# Patient Record
Sex: FEMALE | Race: WHITE | NOT HISPANIC OR LATINO | URBAN - METROPOLITAN AREA
[De-identification: names, ages, dates, MRNs, and addresses within clinical notes are randomized per-mention and may not be internally consistent; named-entity substitution may affect disease eponyms.]

---

## 2024-06-11 DIAGNOSIS — D72.829 LEUKOCYTOSIS: Primary | ICD-10-CM

## 2024-06-11 DIAGNOSIS — D75.839 THROMBOCYTOSIS: ICD-10-CM

## 2024-06-11 DIAGNOSIS — R71.8 ELEVATED HEMATOCRIT: ICD-10-CM

## 2024-06-13 ENCOUNTER — OFFICE VISIT (OUTPATIENT)
Dept: HEMATOLOGY/ONCOLOGY | Facility: CLINIC | Age: 59
End: 2024-06-13
Payer: MEDICAID

## 2024-06-13 VITALS
HEART RATE: 92 BPM | OXYGEN SATURATION: 95 % | DIASTOLIC BLOOD PRESSURE: 73 MMHG | HEIGHT: 66 IN | BODY MASS INDEX: 33.51 KG/M2 | RESPIRATION RATE: 14 BRPM | WEIGHT: 208.5 LBS | TEMPERATURE: 98 F | SYSTOLIC BLOOD PRESSURE: 151 MMHG

## 2024-06-13 DIAGNOSIS — D72.829 LEUKOCYTOSIS: ICD-10-CM

## 2024-06-13 DIAGNOSIS — D75.839 THROMBOCYTOSIS: ICD-10-CM

## 2024-06-13 DIAGNOSIS — R71.8 ELEVATED HEMATOCRIT: ICD-10-CM

## 2024-06-13 PROCEDURE — 3008F BODY MASS INDEX DOCD: CPT | Mod: CPTII,,, | Performed by: STUDENT IN AN ORGANIZED HEALTH CARE EDUCATION/TRAINING PROGRAM

## 2024-06-13 PROCEDURE — 3078F DIAST BP <80 MM HG: CPT | Mod: CPTII,,, | Performed by: STUDENT IN AN ORGANIZED HEALTH CARE EDUCATION/TRAINING PROGRAM

## 2024-06-13 PROCEDURE — 1159F MED LIST DOCD IN RCRD: CPT | Mod: CPTII,,, | Performed by: STUDENT IN AN ORGANIZED HEALTH CARE EDUCATION/TRAINING PROGRAM

## 2024-06-13 PROCEDURE — 99204 OFFICE O/P NEW MOD 45 MIN: CPT | Mod: ,,, | Performed by: STUDENT IN AN ORGANIZED HEALTH CARE EDUCATION/TRAINING PROGRAM

## 2024-06-13 PROCEDURE — 3077F SYST BP >= 140 MM HG: CPT | Mod: CPTII,,, | Performed by: STUDENT IN AN ORGANIZED HEALTH CARE EDUCATION/TRAINING PROGRAM

## 2024-06-13 RX ORDER — HYDROCODONE BITARTRATE AND ACETAMINOPHEN 10; 325 MG/1; MG/1
1 TABLET ORAL 3 TIMES DAILY
COMMUNITY
Start: 2024-05-28

## 2024-06-13 RX ORDER — OMEPRAZOLE 20 MG/1
20 CAPSULE, DELAYED RELEASE ORAL DAILY
COMMUNITY

## 2024-06-13 RX ORDER — METOPROLOL SUCCINATE 50 MG/1
50 TABLET, EXTENDED RELEASE ORAL
COMMUNITY
Start: 2024-02-26

## 2024-06-13 RX ORDER — GABAPENTIN 300 MG/1
300-600 CAPSULE ORAL NIGHTLY
COMMUNITY
Start: 2024-04-16

## 2024-06-13 RX ORDER — RIVAROXABAN 2.5 MG/1
1 TABLET, FILM COATED ORAL 2 TIMES DAILY
COMMUNITY
Start: 2024-06-01

## 2024-06-13 RX ORDER — EZETIMIBE 10 MG/1
10 TABLET ORAL
COMMUNITY
Start: 2024-04-16

## 2024-06-13 RX ORDER — AMLODIPINE BESYLATE 5 MG/1
5 TABLET ORAL
COMMUNITY
Start: 2024-04-16

## 2024-06-13 RX ORDER — CILOSTAZOL 100 MG/1
100 TABLET ORAL 2 TIMES DAILY
COMMUNITY
Start: 2024-06-12

## 2024-06-13 RX ORDER — TIZANIDINE 4 MG/1
4-8 TABLET ORAL NIGHTLY
COMMUNITY
Start: 2024-06-12

## 2024-06-13 RX ORDER — PRAVASTATIN SODIUM 80 MG/1
80 TABLET ORAL
COMMUNITY
Start: 2024-06-12

## 2024-06-13 RX ORDER — ASPIRIN 81 MG/1
81 TABLET ORAL
COMMUNITY
Start: 2024-02-16

## 2024-06-13 RX ORDER — ALENDRONATE SODIUM 10 MG/1
10 TABLET ORAL
COMMUNITY
Start: 2024-05-14

## 2024-06-13 RX ORDER — CYCLOBENZAPRINE HCL 10 MG
10 TABLET ORAL 3 TIMES DAILY
COMMUNITY
Start: 2024-05-23

## 2024-06-13 NOTE — PROGRESS NOTES
Referring provider:  Yesy Pinon NP    Reason for consultation:  Leukocytosis, thrombocytosis    HPI:    Patient is a 58-year-old with history of PAD, CAD, hypertension, hyperlipidemia and chronic pain was seen by her primary care provider and had blood work done with findings of thrombocytosis, leukocytosis.  Patient was referred to our clinic for further treatment recommendations.  She presented to clinic on 06/13/2024 to establish care.    Interval history:    Today, 06/13/2024, denies any acute concerns today.  She denies any fevers, chills, night sweats.  She denies any over-the-counter herbs supplements currently.  She denies any recent steroid use    Laboratory       04/02/2024:  WBC count 12.6 hemoglobin 14.6, hematocrit 45.1, platelet count 370.  01/03/2024 WBC count 11.3, hemoglobin 14.7, MCV 96, platelet count 383, ANC 6.9.  05/9/23:  WBC count 11.12, hemoglobin 14.1, platelet count 368.        History reviewed. No pertinent past medical history.    History reviewed. No pertinent surgical history.    No family history on file.    Social History     Socioeconomic History    Marital status: Unknown   Tobacco Use    Smoking status: Every Day     Current packs/day: 1.00     Types: Cigarettes    Smokeless tobacco: Never   Substance and Sexual Activity    Alcohol use: Yes    Drug use: Never    Sexual activity: Yes       Current Outpatient Medications   Medication Sig Dispense Refill    alendronate (FOSAMAX) 10 MG Tab Take 10 mg by mouth.      amLODIPine (NORVASC) 5 MG tablet Take 5 mg by mouth.      aspirin (ECOTRIN) 81 MG EC tablet Take 81 mg by mouth.      cilostazoL (PLETAL) 100 MG Tab Take 100 mg by mouth 2 (two) times daily.      cyclobenzaprine (FLEXERIL) 10 MG tablet Take 10 mg by mouth 3 (three) times daily.      ezetimibe (ZETIA) 10 mg tablet Take 10 mg by mouth.      gabapentin (NEURONTIN) 300 MG capsule Take 300-600 mg by mouth every evening.      HYDROcodone-acetaminophen (NORCO)  mg  per tablet Take 1 tablet by mouth 3 (three) times daily.      pravastatin (PRAVACHOL) 80 MG tablet Take 80 mg by mouth.      tiZANidine (ZANAFLEX) 4 MG tablet Take 4-8 mg by mouth every evening.      XARELTO 2.5 mg Tab Take 1 tablet by mouth 2 (two) times daily.      metoprolol succinate (TOPROL-XL) 50 MG 24 hr tablet Take 50 mg by mouth.      omeprazole (PRILOSEC) 20 MG capsule Take 20 mg by mouth once daily.       No current facility-administered medications for this visit.       Review of patient's allergies indicates:  No Known Allergies      Review of systems  CONSTITUTIONAL: no fevers, no chills, no weight loss, no fatigue, no weakness  HEMATOLOGIC: no abnormal bleeding, no abnormal bruising, no drenching night sweats  ONCOLOGIC: no new masses or lumps  HEENT: no vision loss, no tinnitus or hearing loss, no nose bleeding, no dysphagia, no odynophagia  CVS: no chest pain, no palpitations, no dyspnea on exertion  RESP: no shortness of breath, no hemoptysis, no cough  GI: no nausea, no vomiting, no diarrhea, no constipation, no melena, no hematochezia, no hematemesis, no abdominal pain, no increase in abdominal girth  : no dysuria, no hematuria, no hesitancy, no scrotal swelling, no discharge  INTEGUMENT: no rashes, no abnormal bruising, no nail pitting, no hyperpigmentation  NEURO: no falls, no memory loss, no paresthesias or dysesthesias, no urofecal incontinence or retention, no loss of strength on any extremity  MSK: no back pain, no new joint pain, no joint swelling  PSYCH: no suicidal or homicidal ideation, no depression, no insomnia, no anhedonia  ENDOCRINE: no heat or cold intolerance, no polyuria, no polydipsia      Physical Exam:  Vitals:    06/13/24 1444   BP: (!) 151/73   Pulse: 92   Resp: 14   Temp: 97.9 °F (36.6 °C)       ECOG PS 0  GA: AAOx3, NAD  HEENT: NCAT, PERRLA, EOMI, upper and lower dentures, moist oral mucous membranes  LYMPH: no cervical, axillary or supraclavicular adenopathy  CVS: s1s2  RRR, no M/R/G  RESP: CTA b/l, no crackles, no wheezes or rhonchi  ABD: soft, NT, ND, BS+, no hepatosplenomegaly  EXT: no deformities, no pedal edema  SKIN: no rashes, warm and dry  NEURO: normal mentation, strength 5/5 on all 4 extremities, no sensory deficits        Assessment    # Leukocytosis, likely reactive   # Thrombocytosis likely reactive     Discussed patient the diagnosis of leukocytosis thrombocytosis, likely reactive in nature and need for further workup to narrow down the differential which will dictate further treatment recommendations.            Plan     CBC, CMP, pathology review of smear, ESR and CRP today   Plan to follow-up in 1 month repeat CBC to discuss further treatment recommendations      A total of  45 minutes were spent in review of records, interpretation of test, coordination of care, discussion and counseling with the patient.        Portions of the record may have been created with voice recognition software. Occasional wrong-word or sound-a-like substitutions may have occurred due to the inherent limitations of voice recognition software.

## 2024-07-16 ENCOUNTER — OFFICE VISIT (OUTPATIENT)
Dept: HEMATOLOGY/ONCOLOGY | Facility: CLINIC | Age: 59
End: 2024-07-16
Payer: MEDICAID

## 2024-07-16 VITALS
TEMPERATURE: 99 F | HEART RATE: 100 BPM | RESPIRATION RATE: 14 BRPM | WEIGHT: 205.81 LBS | SYSTOLIC BLOOD PRESSURE: 155 MMHG | OXYGEN SATURATION: 95 % | BODY MASS INDEX: 33.07 KG/M2 | HEIGHT: 66 IN | DIASTOLIC BLOOD PRESSURE: 77 MMHG

## 2024-07-16 DIAGNOSIS — L73.9 FOLLICULITIS: ICD-10-CM

## 2024-07-16 DIAGNOSIS — D72.829 LEUKOCYTOSIS, UNSPECIFIED TYPE: Primary | ICD-10-CM

## 2024-07-16 PROCEDURE — 3008F BODY MASS INDEX DOCD: CPT | Mod: CPTII,,, | Performed by: STUDENT IN AN ORGANIZED HEALTH CARE EDUCATION/TRAINING PROGRAM

## 2024-07-16 PROCEDURE — 1159F MED LIST DOCD IN RCRD: CPT | Mod: CPTII,,, | Performed by: STUDENT IN AN ORGANIZED HEALTH CARE EDUCATION/TRAINING PROGRAM

## 2024-07-16 PROCEDURE — 3077F SYST BP >= 140 MM HG: CPT | Mod: CPTII,,, | Performed by: STUDENT IN AN ORGANIZED HEALTH CARE EDUCATION/TRAINING PROGRAM

## 2024-07-16 PROCEDURE — 3078F DIAST BP <80 MM HG: CPT | Mod: CPTII,,, | Performed by: STUDENT IN AN ORGANIZED HEALTH CARE EDUCATION/TRAINING PROGRAM

## 2024-07-16 PROCEDURE — 99214 OFFICE O/P EST MOD 30 MIN: CPT | Mod: ,,, | Performed by: STUDENT IN AN ORGANIZED HEALTH CARE EDUCATION/TRAINING PROGRAM

## 2024-07-16 RX ORDER — MUPIROCIN 20 MG/G
OINTMENT TOPICAL 3 TIMES DAILY
Qty: 15 G | Refills: 2 | Status: SHIPPED | OUTPATIENT
Start: 2024-07-16

## 2024-07-16 NOTE — PROGRESS NOTES
Referring provider:  Yesy Pinon NP    Reason for consultation:  Leukocytosis, thrombocytosis    HPI:    Patient is a 58-year-old with history of PAD, CAD, hypertension, hyperlipidemia and chronic pain was seen by her primary care provider and had blood work done with findings of thrombocytosis, leukocytosis.  Patient was referred to our clinic for further treatment recommendations.  She presented to clinic on 06/13/2024 to establish care.    Interval history:    Today, 07/16/2024, patient denies any acute concerns today.  She denies any symptoms of fevers, chills, night sweats, decreased appetite or weight loss.  She does report having symptoms of folliculitis in his pubic area which resolve with topical antibiotic ointment.  She denies any new medications, ER or hospital visits since last follow-up with us.    Laboratory     07/09/2024 WBC count 14.18, hemoglobin 14.8, MCV 91.1, platelet count 354, ANC 7.5  06/13/2024 CMP unremarkable, CRP 1.9, WBC count 12.1, hemoglobin 14.1, MCV 91.2, platelet count 377, ANC 5 point, ESR 25  1424 pathology review of smear slight leukocytosis with absolute lymphocytosis and myeloid shift, adequate normocytic normochromic red cell population, slight thrombocytosis, no circulating blasts identified.  04/02/2024:  WBC count 12.6 hemoglobin 14.6, hematocrit 45.1, platelet count 370.  01/03/2024 WBC count 11.3, hemoglobin 14.7, MCV 96, platelet count 383, ANC 6.9.  05/9/23:  WBC count 11.12, hemoglobin 14.1, platelet count 368.        Past Medical History:   Diagnosis Date    Dyslipidemia     Hepatitis B     Hyperlipidemia     Hypertension     Neck pain, chronic     Osteoporosis        History reviewed. No pertinent surgical history.    Family History   Problem Relation Name Age of Onset    Lung cancer Mother      Bladder Cancer Father         Social History     Socioeconomic History    Marital status: Unknown   Tobacco Use    Smoking status: Every Day     Current packs/day:  1.00     Types: Cigarettes    Smokeless tobacco: Never   Substance and Sexual Activity    Alcohol use: Yes    Drug use: Never       Current Outpatient Medications   Medication Sig Dispense Refill    amLODIPine (NORVASC) 5 MG tablet Take 5 mg by mouth.      aspirin (ECOTRIN) 81 MG EC tablet Take 81 mg by mouth.      cilostazoL (PLETAL) 100 MG Tab Take 100 mg by mouth 2 (two) times daily.      cyclobenzaprine (FLEXERIL) 10 MG tablet Take 10 mg by mouth 3 (three) times daily.      ezetimibe (ZETIA) 10 mg tablet Take 10 mg by mouth.      gabapentin (NEURONTIN) 300 MG capsule Take 300-600 mg by mouth every evening.      HYDROcodone-acetaminophen (NORCO)  mg per tablet Take 1 tablet by mouth 3 (three) times daily.      metoprolol succinate (TOPROL-XL) 50 MG 24 hr tablet Take 50 mg by mouth.      omeprazole (PRILOSEC) 20 MG capsule Take 20 mg by mouth once daily.      pravastatin (PRAVACHOL) 80 MG tablet Take 80 mg by mouth.      tiZANidine (ZANAFLEX) 4 MG tablet Take 4-8 mg by mouth every evening.      XARELTO 2.5 mg Tab Take 1 tablet by mouth 2 (two) times daily.      alendronate (FOSAMAX) 10 MG Tab Take 10 mg by mouth. (Patient not taking: Reported on 7/16/2024)       No current facility-administered medications for this visit.       Review of patient's allergies indicates:   Allergen Reactions    Crestor [rosuvastatin]      Chest pain          Review of systems  CONSTITUTIONAL: no fevers, no chills, no weight loss, no fatigue, no weakness  HEMATOLOGIC: no abnormal bleeding, no abnormal bruising, no drenching night sweats  ONCOLOGIC: no new masses or lumps  HEENT: no vision loss, no tinnitus or hearing loss, no nose bleeding, no dysphagia, no odynophagia  CVS: no chest pain, no palpitations, no dyspnea on exertion  RESP: no shortness of breath, no hemoptysis, no cough  GI: no nausea, no vomiting, no diarrhea, no constipation, no melena, no hematochezia, no hematemesis, no abdominal pain, no increase in abdominal  girth  : no dysuria, no hematuria, no hesitancy, no scrotal swelling, no discharge  INTEGUMENT: no rashes, no abnormal bruising, no nail pitting, no hyperpigmentation  NEURO: no falls, no memory loss, no paresthesias or dysesthesias, no urofecal incontinence or retention, no loss of strength on any extremity  MSK: no back pain, no new joint pain, no joint swelling  PSYCH: no suicidal or homicidal ideation, no depression, no insomnia, no anhedonia  ENDOCRINE: no heat or cold intolerance, no polyuria, no polydipsia      Physical Exam:  Vitals:    07/16/24 0943   BP: (!) 155/77   Pulse: 100   Resp: 14   Temp: 98.6 °F (37 °C)       ECOG PS 0  GA: AAOx3, NAD  HEENT: NCAT, PERRLA, EOMI, upper and lower dentures, moist oral mucous membranes  LYMPH: no cervical, axillary or supraclavicular adenopathy  CVS: s1s2 RRR, no M/R/G  RESP: CTA b/l, no crackles, no wheezes or rhonchi  ABD: soft, NT, ND, BS+, no hepatosplenomegaly  EXT: no deformities, no pedal edema  SKIN: no rashes, warm and dry  NEURO: normal mentation, strength 5/5 on all 4 extremities, no sensory deficits        Assessment    # Leukocytosis, likely reactive with mild absolute lymphocytosis.  Noted elevated CRP.  Patient reports folliculitis in her pubic area  # Thrombocytosis likely reactive     Discussed patient the diagnosis of leukocytosis thrombocytosis, likely reactive in nature and need for further workup to narrow down the differential which will dictate further treatment recommendations.    Discuss elevated CRP which might explain her elevated WBC count.  Pathology review of smear without evidence of immature cells/smudge cells.    Given slight lymphocytosis discussed the plan to obtain peripheral blood cytometry 2 months in addition to other labs to rule out monoclonal B-cell lymphocytosis.          Plan   Patient denies any fevers, chills, night sweats, decreased appetite or weight loss.  CBC, CMP, peripheral blood flow cytometry in 3  months  Mupirocin topical ointment for folliculitis  Plan to follow-up in 3 months with the above workup to discuss further treatment recommendations.    A total of  30  minutes were spent in review of records, interpretation of test, coordination of care, discussion and counseling with the patient.        Portions of the record may have been created with voice recognition software. Occasional wrong-word or sound-a-like substitutions may have occurred due to the inherent limitations of voice recognition software.

## 2024-11-01 ENCOUNTER — OFFICE VISIT (OUTPATIENT)
Dept: HEMATOLOGY/ONCOLOGY | Facility: CLINIC | Age: 59
End: 2024-11-01
Payer: MEDICAID

## 2024-11-01 VITALS
SYSTOLIC BLOOD PRESSURE: 150 MMHG | HEART RATE: 101 BPM | WEIGHT: 206 LBS | BODY MASS INDEX: 33.11 KG/M2 | HEIGHT: 66 IN | RESPIRATION RATE: 14 BRPM | TEMPERATURE: 99 F | DIASTOLIC BLOOD PRESSURE: 75 MMHG | OXYGEN SATURATION: 95 %

## 2024-11-01 DIAGNOSIS — D72.829 LEUKOCYTOSIS, UNSPECIFIED TYPE: Primary | ICD-10-CM

## 2024-11-01 RX ORDER — CLOPIDOGREL BISULFATE 75 MG/1
75 TABLET ORAL
COMMUNITY
Start: 2024-09-19

## 2025-05-02 ENCOUNTER — OFFICE VISIT (OUTPATIENT)
Dept: HEMATOLOGY/ONCOLOGY | Facility: CLINIC | Age: 60
End: 2025-05-02
Payer: MEDICAID

## 2025-05-02 DIAGNOSIS — D72.829 LEUKOCYTOSIS, UNSPECIFIED TYPE: Primary | ICD-10-CM

## 2025-05-02 NOTE — PROGRESS NOTES
Audio Only Telehealth Visit     The patient location is: home  The chief complaint leading to consultation is: leukocytosis  Visit type: Virtual visit with audio only (telephone)  Total time spent in medical discussion with patient: 10 minutes  Total time spent on date of the encounter:10 minutes       The reason for the audio only service rather than synchronous audio and video virtual visit was related to technical difficulties or patient preference/necessity.       Each patient to whom I provide medical services by telemedicine is:  (1) informed of the relationship between the physician and patient and the respective role of any other health care provider with respect to management of the patient; and (2) notified that they may decline to receive medical services by telemedicine and may withdraw from such care at any time. Patient verbally consented to receive this service via voice-only telephone call.      Referring provider:  Yesy Pinon NP    Reason for consultation:  Leukocytosis, thrombocytosis    HPI:    Patient is a 58-year-old with history of PAD, CAD, hypertension, hyperlipidemia and chronic pain was seen by her primary care provider and had blood work done with findings of thrombocytosis, leukocytosis.  Patient was referred to our clinic for further treatment recommendations.  She presented to clinic on 06/13/2024 to establish care.    Interval history:    Today, 05/02/25, patient denies any acute concerns today.  She denies any symptoms of fevers, chills, night sweats, decreased appetite or weight loss.   She denies any new medications, ER or hospital visits since last follow-up with us.    Laboratory     04/30/25: CMP unremarkable, wbc 11.43 hgb 14.7, plt 331  10/22/24: wbc 12.20, hgb 14.0, plt 356, ANC 5.72, ALC 5.21, alkphos 152, CMP otherwise unremarkable.  07/09/2024 WBC count 14.18, hemoglobin 14.8, MCV 91.1, platelet count 354, ANC 7.5  06/13/2024 CMP unremarkable, CRP 1.9, WBC count  12.1, hemoglobin 14.1, MCV 91.2, platelet count 377, ANC 5 point, ESR 25  1424 pathology review of smear slight leukocytosis with absolute lymphocytosis and myeloid shift, adequate normocytic normochromic red cell population, slight thrombocytosis, no circulating blasts identified.  04/02/2024:  WBC count 12.6 hemoglobin 14.6, hematocrit 45.1, platelet count 370.  01/03/2024 WBC count 11.3, hemoglobin 14.7, MCV 96, platelet count 383, ANC 6.9.  05/9/23:  WBC count 11.12, hemoglobin 14.1, platelet count 368.    Pathology  10/22/24 Peripheral smear w/ flow cytometry No significant immunophenotypic abnormalities of myeloid or lymphoid cells noted.         Past Medical History:   Diagnosis Date    Dyslipidemia     Hepatitis B     Hyperlipidemia     Hypertension     Neck pain, chronic     Osteoporosis        Past Surgical History:   Procedure Laterality Date    ANGIOGRAPHY         Family History   Problem Relation Name Age of Onset    Lung cancer Mother      Bladder Cancer Father         Social History     Socioeconomic History    Marital status: Unknown   Tobacco Use    Smoking status: Every Day     Current packs/day: 1.00     Types: Cigarettes    Smokeless tobacco: Never   Substance and Sexual Activity    Alcohol use: Yes    Drug use: Never       Current Outpatient Medications   Medication Sig Dispense Refill    alendronate (FOSAMAX) 10 MG Tab Take 10 mg by mouth. (Patient not taking: Reported on 7/16/2024)      amLODIPine (NORVASC) 5 MG tablet Take 5 mg by mouth.      aspirin (ECOTRIN) 81 MG EC tablet Take 81 mg by mouth.      cilostazoL (PLETAL) 100 MG Tab Take 100 mg by mouth 2 (two) times daily.      clopidogreL (PLAVIX) 75 mg tablet Take 75 mg by mouth.      cyclobenzaprine (FLEXERIL) 10 MG tablet Take 10 mg by mouth 3 (three) times daily.      ezetimibe (ZETIA) 10 mg tablet Take 10 mg by mouth.      gabapentin (NEURONTIN) 300 MG capsule Take 300-600 mg by mouth every evening.      HYDROcodone-acetaminophen  (NORCO)  mg per tablet Take 1 tablet by mouth 3 (three) times daily.      metoprolol succinate (TOPROL-XL) 50 MG 24 hr tablet Take 50 mg by mouth.      mupirocin (BACTROBAN) 2 % ointment Apply topically 3 (three) times daily. 15 g 2    omeprazole (PRILOSEC) 20 MG capsule Take 20 mg by mouth once daily.      pravastatin (PRAVACHOL) 80 MG tablet Take 80 mg by mouth.      tiZANidine (ZANAFLEX) 4 MG tablet Take 4-8 mg by mouth every evening.      XARELTO 2.5 mg Tab Take 1 tablet by mouth 2 (two) times daily.       No current facility-administered medications for this visit.       Review of patient's allergies indicates:   Allergen Reactions    Crestor [rosuvastatin]      Chest pain          Review of systems  CONSTITUTIONAL: no fevers, no chills, no weight loss, no fatigue, no weakness  HEMATOLOGIC: no abnormal bleeding, no abnormal bruising, no drenching night sweats  ONCOLOGIC: no new masses or lumps  HEENT: no vision loss, no tinnitus or hearing loss, no nose bleeding, no dysphagia, no odynophagia  CVS: no chest pain, no palpitations, no dyspnea on exertion  RESP: no shortness of breath, no hemoptysis, no cough  GI: no nausea, no vomiting, no diarrhea, no constipation, no melena, no hematochezia, no hematemesis, no abdominal pain, no increase in abdominal girth  : no dysuria, no hematuria, no hesitancy, no scrotal swelling, no discharge  INTEGUMENT: no rashes, no abnormal bruising, no nail pitting, no hyperpigmentation  NEURO: no falls, no memory loss, no paresthesias or dysesthesias, no urofecal incontinence or retention, no loss of strength on any extremity  MSK: no back pain, no new joint pain, no joint swelling  PSYCH: no suicidal or homicidal ideation, no depression, no insomnia, no anhedonia  ENDOCRINE: no heat or cold intolerance, no polyuria, no polydipsia      Physical Exam:  There were no vitals filed for this visit.        ECOG PS 0  GA: AAOx3, NAD  HEENT: NCAT, PERRLA, EOMI, upper and lower  dentures, moist oral mucous membranes  LYMPH: no cervical, axillary or supraclavicular adenopathy  CVS: s1s2 RRR, no M/R/G  RESP: CTA b/l, no crackles, no wheezes or rhonchi  ABD: soft, NT, ND, BS+, no hepatosplenomegaly  EXT: no deformities, no pedal edema  SKIN: no rashes, warm and dry  NEURO: normal mentation, strength 5/5 on all 4 extremities, no sensory deficits        Assessment    # Leukocytosis, likely reactive with mild absolute lymphocytosis.  Noted elevated CRP.  Patient reports folliculitis in her pubic area  # Thrombocytosis likely reactive     Discussed patient the diagnosis of leukocytosis thrombocytosis, likely reactive in nature and need for further workup to narrow down the differential which will dictate further treatment recommendations.    Discuss elevated CRP which might explain her elevated WBC count.  Pathology review of smear without evidence of immature cells/smudge cells.    Given slight lymphocytosis discussed the plan to obtain peripheral blood cytometry 2 months in addition to other labs to rule out monoclonal B-cell lymphocytosis.          Plan   Patient denies any fevers, chills, night sweats, decreased appetite or weight loss.  Plan to follow-up 1 year with CBC, CMP    A total of  30  minutes were spent in review of records, interpretation of test, coordination of care, discussion and counseling with the patient.                     This service was not originating from a related E/M service provided within the previous 7 days nor will  to an E/M service or procedure within the next 24 hours or my soonest available appointment.  Prevailing standard of care was able to be met in this audio-only visit.